# Patient Record
Sex: FEMALE | Race: BLACK OR AFRICAN AMERICAN | NOT HISPANIC OR LATINO | Employment: UNEMPLOYED | URBAN - METROPOLITAN AREA
[De-identification: names, ages, dates, MRNs, and addresses within clinical notes are randomized per-mention and may not be internally consistent; named-entity substitution may affect disease eponyms.]

---

## 2022-10-01 ENCOUNTER — APPOINTMENT (OUTPATIENT)
Dept: CT IMAGING | Facility: HOSPITAL | Age: 67
End: 2022-10-01

## 2022-10-01 ENCOUNTER — APPOINTMENT (OUTPATIENT)
Dept: GENERAL RADIOLOGY | Facility: HOSPITAL | Age: 67
End: 2022-10-01

## 2022-10-01 ENCOUNTER — HOSPITAL ENCOUNTER (OUTPATIENT)
Facility: HOSPITAL | Age: 67
Setting detail: OBSERVATION
Discharge: HOME | End: 2022-10-02
Attending: EMERGENCY MEDICINE | Admitting: INTERNAL MEDICINE

## 2022-10-01 DIAGNOSIS — R10.10 UPPER ABDOMINAL PAIN: ICD-10-CM

## 2022-10-01 DIAGNOSIS — I16.0 HYPERTENSIVE URGENCY: Primary | ICD-10-CM

## 2022-10-01 DIAGNOSIS — R07.89 ATYPICAL CHEST PAIN: ICD-10-CM

## 2022-10-01 DIAGNOSIS — N83.201 RIGHT OVARIAN CYST: ICD-10-CM

## 2022-10-01 LAB
ALBUMIN SERPL-MCNC: 4.2 G/DL (ref 3.5–5.2)
ALBUMIN/GLOB SERPL: 1.2 G/DL
ALP SERPL-CCNC: 82 U/L (ref 39–117)
ALT SERPL W P-5'-P-CCNC: 17 U/L (ref 1–33)
ANION GAP SERPL CALCULATED.3IONS-SCNC: 10.4 MMOL/L (ref 5–15)
AST SERPL-CCNC: 25 U/L (ref 1–32)
BASOPHILS # BLD AUTO: 0.01 10*3/MM3 (ref 0–0.2)
BASOPHILS NFR BLD AUTO: 0.2 % (ref 0–1.5)
BILIRUB SERPL-MCNC: 0.3 MG/DL (ref 0–1.2)
BILIRUB UR QL STRIP: NEGATIVE
BUN SERPL-MCNC: 14 MG/DL (ref 8–23)
BUN/CREAT SERPL: 16.3 (ref 7–25)
CALCIUM SPEC-SCNC: 8.7 MG/DL (ref 8.6–10.5)
CHLORIDE SERPL-SCNC: 100 MMOL/L (ref 98–107)
CLARITY UR: CLEAR
CO2 SERPL-SCNC: 28.6 MMOL/L (ref 22–29)
COLOR UR: YELLOW
CREAT SERPL-MCNC: 0.86 MG/DL (ref 0.57–1)
DEPRECATED RDW RBC AUTO: 41.2 FL (ref 37–54)
EGFRCR SERPLBLD CKD-EPI 2021: 73.2 ML/MIN/1.73
EOSINOPHIL # BLD AUTO: 0.02 10*3/MM3 (ref 0–0.4)
EOSINOPHIL NFR BLD AUTO: 0.4 % (ref 0.3–6.2)
ERYTHROCYTE [DISTWIDTH] IN BLOOD BY AUTOMATED COUNT: 12.4 % (ref 12.3–15.4)
GLOBULIN UR ELPH-MCNC: 3.4 GM/DL
GLUCOSE SERPL-MCNC: 90 MG/DL (ref 65–99)
GLUCOSE UR STRIP-MCNC: NEGATIVE MG/DL
HCT VFR BLD AUTO: 37.3 % (ref 34–46.6)
HGB BLD-MCNC: 12 G/DL (ref 12–15.9)
HGB UR QL STRIP.AUTO: NEGATIVE
HOLD SPECIMEN: NORMAL
HOLD SPECIMEN: NORMAL
IMM GRANULOCYTES # BLD AUTO: 0.02 10*3/MM3 (ref 0–0.05)
IMM GRANULOCYTES NFR BLD AUTO: 0.4 % (ref 0–0.5)
KETONES UR QL STRIP: NEGATIVE
LEUKOCYTE ESTERASE UR QL STRIP.AUTO: NEGATIVE
LIPASE SERPL-CCNC: 16 U/L (ref 13–60)
LYMPHOCYTES # BLD AUTO: 2.21 10*3/MM3 (ref 0.7–3.1)
LYMPHOCYTES NFR BLD AUTO: 43.9 % (ref 19.6–45.3)
MCH RBC QN AUTO: 28.9 PG (ref 26.6–33)
MCHC RBC AUTO-ENTMCNC: 32.2 G/DL (ref 31.5–35.7)
MCV RBC AUTO: 89.9 FL (ref 79–97)
MONOCYTES # BLD AUTO: 0.4 10*3/MM3 (ref 0.1–0.9)
MONOCYTES NFR BLD AUTO: 8 % (ref 5–12)
NEUTROPHILS NFR BLD AUTO: 2.37 10*3/MM3 (ref 1.7–7)
NEUTROPHILS NFR BLD AUTO: 47.1 % (ref 42.7–76)
NITRITE UR QL STRIP: NEGATIVE
NRBC BLD AUTO-RTO: 1 /100 WBC (ref 0–0.2)
PH UR STRIP.AUTO: 7 [PH] (ref 5–8)
PLATELET # BLD AUTO: 272 10*3/MM3 (ref 140–450)
PMV BLD AUTO: 8.6 FL (ref 6–12)
POTASSIUM SERPL-SCNC: 3.7 MMOL/L (ref 3.5–5.2)
PROT SERPL-MCNC: 7.6 G/DL (ref 6–8.5)
PROT UR QL STRIP: NEGATIVE
QT INTERVAL: 368 MS
RBC # BLD AUTO: 4.15 10*6/MM3 (ref 3.77–5.28)
SODIUM SERPL-SCNC: 139 MMOL/L (ref 136–145)
SP GR UR STRIP: 1.02 (ref 1–1.03)
TROPONIN T SERPL-MCNC: <0.01 NG/ML (ref 0–0.03)
TROPONIN T SERPL-MCNC: <0.01 NG/ML (ref 0–0.03)
UROBILINOGEN UR QL STRIP: NORMAL
WBC NRBC COR # BLD: 5.03 10*3/MM3 (ref 3.4–10.8)
WHOLE BLOOD HOLD COAG: NORMAL
WHOLE BLOOD HOLD SPECIMEN: NORMAL

## 2022-10-01 PROCEDURE — G0378 HOSPITAL OBSERVATION PER HR: HCPCS

## 2022-10-01 PROCEDURE — 93005 ELECTROCARDIOGRAM TRACING: CPT | Performed by: EMERGENCY MEDICINE

## 2022-10-01 PROCEDURE — 71275 CT ANGIOGRAPHY CHEST: CPT

## 2022-10-01 PROCEDURE — 96375 TX/PRO/DX INJ NEW DRUG ADDON: CPT

## 2022-10-01 PROCEDURE — 25010000002 MORPHINE PER 10 MG: Performed by: EMERGENCY MEDICINE

## 2022-10-01 PROCEDURE — 96374 THER/PROPH/DIAG INJ IV PUSH: CPT

## 2022-10-01 PROCEDURE — 74177 CT ABD & PELVIS W/CONTRAST: CPT

## 2022-10-01 PROCEDURE — 25010000002 ONDANSETRON PER 1 MG: Performed by: EMERGENCY MEDICINE

## 2022-10-01 PROCEDURE — 85025 COMPLETE CBC W/AUTO DIFF WBC: CPT | Performed by: EMERGENCY MEDICINE

## 2022-10-01 PROCEDURE — 80053 COMPREHEN METABOLIC PANEL: CPT | Performed by: EMERGENCY MEDICINE

## 2022-10-01 PROCEDURE — 0 IOPAMIDOL PER 1 ML: Performed by: EMERGENCY MEDICINE

## 2022-10-01 PROCEDURE — 93010 ELECTROCARDIOGRAM REPORT: CPT | Performed by: STUDENT IN AN ORGANIZED HEALTH CARE EDUCATION/TRAINING PROGRAM

## 2022-10-01 PROCEDURE — 96376 TX/PRO/DX INJ SAME DRUG ADON: CPT

## 2022-10-01 PROCEDURE — 99285 EMERGENCY DEPT VISIT HI MDM: CPT

## 2022-10-01 PROCEDURE — 36415 COLL VENOUS BLD VENIPUNCTURE: CPT

## 2022-10-01 PROCEDURE — 83690 ASSAY OF LIPASE: CPT | Performed by: EMERGENCY MEDICINE

## 2022-10-01 PROCEDURE — 84484 ASSAY OF TROPONIN QUANT: CPT | Performed by: EMERGENCY MEDICINE

## 2022-10-01 PROCEDURE — 71045 X-RAY EXAM CHEST 1 VIEW: CPT

## 2022-10-01 PROCEDURE — 81003 URINALYSIS AUTO W/O SCOPE: CPT | Performed by: EMERGENCY MEDICINE

## 2022-10-01 RX ORDER — ONDANSETRON 2 MG/ML
4 INJECTION INTRAMUSCULAR; INTRAVENOUS ONCE
Status: COMPLETED | OUTPATIENT
Start: 2022-10-01 | End: 2022-10-01

## 2022-10-01 RX ORDER — ONDANSETRON 2 MG/ML
4 INJECTION INTRAMUSCULAR; INTRAVENOUS EVERY 6 HOURS PRN
Status: DISCONTINUED | OUTPATIENT
Start: 2022-10-01 | End: 2022-10-02 | Stop reason: HOSPADM

## 2022-10-01 RX ORDER — ONDANSETRON 4 MG/1
4 TABLET, FILM COATED ORAL EVERY 6 HOURS PRN
Status: DISCONTINUED | OUTPATIENT
Start: 2022-10-01 | End: 2022-10-02 | Stop reason: HOSPADM

## 2022-10-01 RX ORDER — MORPHINE SULFATE 2 MG/ML
4 INJECTION, SOLUTION INTRAMUSCULAR; INTRAVENOUS ONCE
Status: COMPLETED | OUTPATIENT
Start: 2022-10-01 | End: 2022-10-01

## 2022-10-01 RX ORDER — LABETALOL HYDROCHLORIDE 5 MG/ML
20 INJECTION, SOLUTION INTRAVENOUS ONCE
Status: COMPLETED | OUTPATIENT
Start: 2022-10-01 | End: 2022-10-01

## 2022-10-01 RX ORDER — LABETALOL HYDROCHLORIDE 5 MG/ML
10 INJECTION, SOLUTION INTRAVENOUS ONCE
Status: COMPLETED | OUTPATIENT
Start: 2022-10-01 | End: 2022-10-01

## 2022-10-01 RX ORDER — NITROGLYCERIN 0.4 MG/1
0.4 TABLET SUBLINGUAL
Status: DISCONTINUED | OUTPATIENT
Start: 2022-10-01 | End: 2022-10-02 | Stop reason: HOSPADM

## 2022-10-01 RX ORDER — VALSARTAN 80 MG/1
80 TABLET ORAL
Status: DISCONTINUED | OUTPATIENT
Start: 2022-10-01 | End: 2022-10-02 | Stop reason: HOSPADM

## 2022-10-01 RX ORDER — ACETAMINOPHEN 325 MG/1
650 TABLET ORAL EVERY 4 HOURS PRN
Status: DISCONTINUED | OUTPATIENT
Start: 2022-10-01 | End: 2022-10-02 | Stop reason: HOSPADM

## 2022-10-01 RX ORDER — SODIUM CHLORIDE 0.9 % (FLUSH) 0.9 %
10 SYRINGE (ML) INJECTION AS NEEDED
Status: DISCONTINUED | OUTPATIENT
Start: 2022-10-01 | End: 2022-10-02 | Stop reason: HOSPADM

## 2022-10-01 RX ORDER — SODIUM CHLORIDE 9 MG/ML
75 INJECTION, SOLUTION INTRAVENOUS CONTINUOUS
Status: DISCONTINUED | OUTPATIENT
Start: 2022-10-01 | End: 2022-10-02 | Stop reason: HOSPADM

## 2022-10-01 RX ORDER — UREA 10 %
3 LOTION (ML) TOPICAL NIGHTLY PRN
Status: DISCONTINUED | OUTPATIENT
Start: 2022-10-01 | End: 2022-10-02 | Stop reason: HOSPADM

## 2022-10-01 RX ADMIN — ACETAMINOPHEN 650 MG: 325 TABLET, FILM COATED ORAL at 22:20

## 2022-10-01 RX ADMIN — LABETALOL HYDROCHLORIDE 10 MG: 5 INJECTION, SOLUTION INTRAVENOUS at 17:53

## 2022-10-01 RX ADMIN — ONDANSETRON 4 MG: 2 INJECTION INTRAMUSCULAR; INTRAVENOUS at 11:37

## 2022-10-01 RX ADMIN — SODIUM CHLORIDE 75 ML/HR: 9 INJECTION, SOLUTION INTRAVENOUS at 22:22

## 2022-10-01 RX ADMIN — IOPAMIDOL 95 ML: 755 INJECTION, SOLUTION INTRAVENOUS at 14:26

## 2022-10-01 RX ADMIN — VALSARTAN 80 MG: 80 TABLET, FILM COATED ORAL at 22:20

## 2022-10-01 RX ADMIN — LABETALOL HYDROCHLORIDE 20 MG: 5 INJECTION, SOLUTION INTRAVENOUS at 11:37

## 2022-10-01 RX ADMIN — MORPHINE SULFATE 4 MG: 2 INJECTION, SOLUTION INTRAMUSCULAR; INTRAVENOUS at 11:38

## 2022-10-01 NOTE — ED PROVIDER NOTES
I supervised care provided by the midlevel provider.   We have discussed this patient's history, physical exam, and treatment plan.  I have reviewed the note and personally saw and examined the patient and agree with the plan of care.   I have seen and examined this patient.  Patient's son who helps translate his bedside as well as  phone used.  Susan peacock is present as well and has obtained history as well as myself.  Patient started early this morning about 2 or 2:30 in the morning with some midepigastric abdominal pain.There is been some nausea and the pain has been constant since that time.  Denied any vomiting or diarrhea.  The pain does radiate up the center of the lower portion of her chest.  She has had previous surgery to her abdomen but cannot tell me specifically what was done.  She had bowel surgery.  She recently moved from [place] about 5 months ago and has been not taking any of her medicines including her high blood pressure medicine.    GENERAL: Elderly female that appears a little uncomfortable no obvious distress.  Patient has significantly elevated blood pressure.  HENT: nares patent  Head/neck/ face are symmetric without gross deformity or swelling  EYES: no scleral icterus  CV: regular rhythm, regular rate with intact distal pulses  RESPIRATORY: normal effort and no respiratory distress  ABDOMEN: soft and \reproducible midepigastric pain and right upper quadrant pain with palpation.  There is some mild voluntary guarding.  There is no rebound.  She has positive bowel sounds.  MUSCULOSKELETAL: no deformity.  Intact distal pulses to upper and lower extremities that are equal strong and symmetric.  NEURO: alert and appropriate, moves all extremities, follows commands  SKIN: warm, dry    Vital signs and nursing notes reviewed.    Plan we discussed with the  as well as the patient and the family in the room of the test that we will order.  I believe this is mainly coming from her  abdomen and unlikely cardiac in etiology.  We will check CT scans.  All questions answered at this time.        ED Course as of 10/02/22 1740   Sat Oct 01, 2022   1125 EKG reading  EKG was done at 11:00 I read the EKG at 11:04 AM  Its rate of 84 it is sinus rhythm there is some movement artifact  Narrow complex with normal axis  I do not appreciate any acute injury pattern  QT looks normal  No old EKG to compare with. [MM]   1206 WBC: 5.03 [KA]   1206 Hemoglobin: 12.0 [KA]   1245 WBC: 5.03 [KA]   1245 Hemoglobin: 12.0 [KA]   1245 Lipase: 16 [KA]   1425 Troponin T: <0.010 [KA]   1425 Glucose: 90 [KA]   1425 Creatinine: 0.86 [KA]   1425 Lipase: 16 [KA]   1541 I reviewed the CT a of the chest as well as a CT of the abdomen pelvis.  In reviewing the report there was no evidence of aortic dissection or aneurysm.  There is right adnexal cystic lesion that is about 5.3 cm.  Previous cholecystectomy.  No  acute emergent condition appreciated.  Please see complete dictated report from the radiologist. [MM]   1543 I have reevaluated the patient.  Blood pressure transiently was improved but is now back up.  We will get a need to give her some more labetalol.  Overall patient is feeling better.  She states that her pain is much better.  Not having much pain at all at this time.  I talked with the patient as well as the family in the room and her son translated.  Informed them of the results of the CT scans and lab work.  All questions answered. [MM]   1549 Patient's blood pressure was 170/88 at 3:46 PM.  Heart rate was in the 70s. [MM]   1626 I discussed the patient with Dr. Benitez, hospitalist.  We discussed the patient's history presentation labs and imaging and she agrees to admit for further evaluation and treatment. [KA]      ED Course User Index  [KA] Abby Babb PA  [MM] Brooks Eller MD       We are currently under a pandemic from the COVID19 infection.  The patient presented to the emergency department by  ambulance or personal vehicle. I followed the current protocols required by Infection Control at [facility] in my evaluation and treatment of the patient. The patient was wearing a face mask during my evaluation and throughout my encounter. During my whole encounter with this patient I used appropriate personal protective equipment.  This equipment consisted of eye protection, facemask, gown, and gloves.  I applied this equipment before entering the room.           Brooks Eller MD  10/02/22 6941

## 2022-10-01 NOTE — ED PROVIDER NOTES
EMERGENCY DEPARTMENT ENCOUNTER    Room Number:  36/36  Date seen:  10/1/2022  Time seen: 10:55 EDT  PCP: No primary care provider on file.  Historian: patient      HPI:  Chief Complaint: Abdominal pain, shortness of breath    A complete HPI/ROS/PMH/PSH/SH/FH are unobtainable due to: None    Context: Suleman Dumont is a 67 y.o. female who presents to the ED for evaluation of cute onset upper abdominal pain dyspnea and nausea that began at 2 AM, woke her from sleep, is constant since.  Nothing really makes it worse, states maybe is a little better after being administered aspirin by EMS.  She reports feeling intermittently short of breath, symptoms are not exertional or pleuritic.  She denies any vomiting diarrhea urinary symptoms.  She drinks about half a beer once a week, no other alcohol use.  Has had a previous surgery on her abdomen, and unspecified bowel surgery that patient and family do not seem to know the details of.  No other abdominal surgeries.  No cardiac disease history, no prior cardiac work-up that they know of.  Patient and family moved here from [place] about 5 months ago, she does not have a PCP.  Has a history of significant hypertension, was previously treated but has been unmedicated since moving.  No known history of high cholesterol or diabetes.    I spent 25 minutes with the patient and family and Brady  ID #[id number] obtaining the past medical history, HPI, physical exam, discussing with them the ER work-up and interventions and answering all questions.    PAST MEDICAL HISTORY  Active Ambulatory Problems     Diagnosis Date Noted   • No Active Ambulatory Problems     Resolved Ambulatory Problems     Diagnosis Date Noted   • No Resolved Ambulatory Problems     No Additional Past Medical History         PAST SURGICAL HISTORY  No past surgical history on file.      FAMILY HISTORY  No family history on file.      SOCIAL HISTORY         ALLERGIES  Shellfish-derived  products        REVIEW OF SYSTEMS  Review of Systems     All systems reviewed and negative except for those discussed in HPI.       PHYSICAL EXAM  ED Triage Vitals [10/01/22 1046]   Temp Heart Rate Resp BP SpO2   97.4 °F (36.3 °C) 82 18 (!) 191/100 98 %      Temp src Heart Rate Source Patient Position BP Location FiO2 (%)   Tympanic Monitor -- -- --         GENERAL: not distressed  HENT: atraumatic  EYES: no scleral icterus  CV: regular rhythm, regular rate, DP and PT pulses 2+, no lower extremity edema  RESPIRATORY: normal effort CTA B, oxygen is 100% on room air  ABDOMEN: soft, tender mostly in the epigastrium, mildly in the right upper quadrant and left upper quadrants.  Nondistended normal bowel sounds no guarding or rigidity  MUSCULOSKELETAL: no deformity  NEURO: alert, moves all extremities, follows commands  SKIN: warm, dry    Vital signs and nursing notes reviewed.          LAB RESULTS  Labs Reviewed   CBC WITH AUTO DIFFERENTIAL - Abnormal; Notable for the following components:       Result Value    nRBC 1.0 (*)     All other components within normal limits   BASIC METABOLIC PANEL - Abnormal; Notable for the following components:    Glucose 113 (*)     All other components within normal limits    Narrative:     GFR Normal >60  Chronic Kidney Disease <60  Kidney Failure <15     CBC (NO DIFF) - Abnormal; Notable for the following components:    Hemoglobin 11.3 (*)     Hematocrit 33.5 (*)     RDW 11.9 (*)     RDW-SD 36.9 (*)     All other components within normal limits   TROPONIN (IN-HOUSE) - Normal    Narrative:     Troponin T Reference Range:  <= 0.03 ng/mL-   Negative for AMI  >0.03 ng/mL-     Abnormal for myocardial necrosis.  Clinicians would have to utilize clinical acumen, EKG, Troponin and serial changes to determine if it is an Acute Myocardial Infarction or myocardial injury due to an underlying chronic condition.       Results may be falsely decreased if patient taking Biotin.     TROPONIN  (IN-HOUSE) - Normal    Narrative:     Troponin T Reference Range:  <= 0.03 ng/mL-   Negative for AMI  >0.03 ng/mL-     Abnormal for myocardial necrosis.  Clinicians would have to utilize clinical acumen, EKG, Troponin and serial changes to determine if it is an Acute Myocardial Infarction or myocardial injury due to an underlying chronic condition.       Results may be falsely decreased if patient taking Biotin.     LIPASE - Normal   URINALYSIS W/ MICROSCOPIC IF INDICATED (NO CULTURE) - Normal    Narrative:     Urine microscopic not indicated.   RAINBOW DRAW    Narrative:     The following orders were created for panel order [place] Draw.  Procedure                               Abnormality         Status                     ---------                               -----------         ------                     Green Top (Gel)[[id number]]                                  Final result               Lavender Top[[id number]]                                     Final result               Gold Top - SST[[id number]]                                   Final result               Light Blue Top[[id number]]                                   Final result                 Please view results for these tests on the individual orders.   COMPREHENSIVE METABOLIC PANEL    Narrative:     GFR Normal >60  Chronic Kidney Disease <60  Kidney Failure <15     GREEN TOP   LAVENDER TOP   GOLD TOP - SST   LIGHT BLUE TOP   CBC AND DIFFERENTIAL    Narrative:     The following orders were created for panel order CBC & Differential.  Procedure                               Abnormality         Status                     ---------                               -----------         ------                     CBC Auto Differential[[id number]]        Abnormal            Final result                 Please view results for these tests on the individual orders.         Ordered the above labs and independently reviewed the results.        RADIOLOGY  CT Abdomen  Pelvis With Contrast, CT Angiogram Chest    Result Date: 10/1/2022  Narrative: CT ANGIOGRAM OF THE CHEST, CT ABDOMEN AND PELVIS WITH IV CONTRAST  HISTORY: Upper abdominal pain and nausea, concern for aortic dissection  TECHNIQUE: Radiation dose reduction techniques were utilized, including automated exposure control and exposure modulation based on body size. CT angiogram of the chest was performed following the administration of IV contrast. Multiple coronal, sagittal, and 3-D reconstruction images were obtained. Following this, CT of the abdomen and pelvis was performed with IV contrast. Coronal and sagittal reformatted images were obtained.  COMPARISON: None available  FINDINGS: CHEST: There is no evidence of aortic dissection or thoracic aortic aneurysm. There is no appreciable lymphadenopathy. There is no pleural effusion. There is dependent atelectasis within the lungs bilaterally. No evidence of dense airspace consolidation. No acute bony abnormality.  ABDOMEN: The liver is unremarkable. Previous cholecystectomy. Small cyst or hemangioma in the spleen. The kidneys, adrenal glands, and pancreas are unremarkable. No evidence of aortic aneurysm.  PELVIS: There is no free fluid in the pelvis. There is a cystic lesion with septations in the right adnexa measuring about 5.3 x 3.9 cm. Colon is unremarkable. The appendix is normal. No acute bony abnormality.      Impression: 1. No evidence of aortic dissection or aneurysm 2. There is a cystic lesion with septations in the right adnexa measures about 5.3 cm. Further evaluation recommended with pelvic ultrasound as well as referral to OB/GYN 3. Previous cholecystectomy.   Radiation dose reduction techniques were utilized, including automated exposure control and exposure modulation based on body size.  This report was finalized on 10/1/2022 2:52 PM by Dr. Ankur Gates M.D.      XR Chest 1 View    Result Date: 10/1/2022  Narrative: XR CHEST 1 VW-  HISTORY:  Nausea,  vomiting, pain.  COMPARISON:  None  FINDINGS:  A single portable view the chest was obtained. The cardiac silhouette is mildly enlarged. There is calcific aortic atherosclerosis. There are mildly prominent interstitial opacities throughout both lungs, most pronounced at the lung bases, which are nonspecific and could reflect edema, background scarring, and/or multifocal pneumonia in the appropriate clinical setting. Otherwise, there is no focal consolidation. Pleural spaces are clear. There is multilevel degenerative disc disease.  This report was finalized on 10/1/2022 11:50 AM by Dr. Reema Diamond M.D.        I ordered the above noted radiological studies. Reviewed by me and discussed with radiologist.  See dictation for official radiology interpretation.    PROCEDURES  Procedures        MEDICATIONS GIVEN IN ER  Medications   sodium chloride 0.9 % flush 10 mL (has no administration in time range)   labetalol (NORMODYNE,TRANDATE) injection 20 mg (has no administration in time range)   ondansetron (ZOFRAN) injection 4 mg (has no administration in time range)   morphine injection 4 mg (has no administration in time range)             PROGRESS AND CONSULTS    DDx includes but is not limited to acute coronary syndrome, pulmonary embolism, thoracic aortic dissection, pneumonia, pneumothorax, musculoskeletal pain, GERD or esophageal spasm, anxiety, myocarditis/pericarditis, esophageal rupture, pancreatitis.     History: 0  EC  Age: 1  Risk factors: 1    HEART score: 3        ED Course as of 10/02/22 1341   Sat Oct 01, 2022   1125 EKG reading  EKG was done at 11:00 I read the EKG at 11:04 AM  Its rate of 84 it is sinus rhythm there is some movement artifact  Narrow complex with normal axis  I do not appreciate any acute injury pattern  QT looks normal  No old EKG to compare with. [MM]   1206 WBC: 5.03 [KA]   1206 Hemoglobin: 12.0 [KA]   1245 WBC: 5.03 [KA]   1245 Hemoglobin: 12.0 [KA]   1245 Lipase: 16 [KA]   1425  Troponin T: <0.010 [KA]   1425 Glucose: 90 [KA]   1425 Creatinine: 0.86 [KA]   1425 Lipase: 16 [KA]   1541 I reviewed the CT a of the chest as well as a CT of the abdomen pelvis.  In reviewing the report there was no evidence of aortic dissection or aneurysm.  There is right adnexal cystic lesion that is about 5.3 cm.  Previous cholecystectomy.  No  acute emergent condition appreciated.  Please see complete dictated report from the radiologist. [MM]   1543 I have reevaluated the patient.  Blood pressure transiently was improved but is now back up.  We will get a need to give her some more labetalol.  Overall patient is feeling better.  She states that her pain is much better.  Not having much pain at all at this time.  I talked with the patient as well as the family in the room and her son translated.  Informed them of the results of the CT scans and lab work.  All questions answered. [MM]   1549 Patient's blood pressure was 170/88 at 3:46 PM.  Heart rate was in the 70s. [MM]   1626 I discussed the patient with Dr. Benitez, hospitalist.  We discussed the patient's history presentation labs and imaging and she agrees to admit for further evaluation and treatment. [KA]      ED Course User Index  [KA] Abby Babb PA  [MM] Brooks Eller MD             Patient was placed in face mask in first look. Patient was wearing facemask each time I entered the room and throughout our encounter. I wore protective equipment throughout this patient encounter including a face mask, eye shield and gloves. Hand hygiene was performed before donning protective equipment and after removal when leaving the room.        DIAGNOSIS  Final diagnoses:   Hypertensive urgency   Atypical chest pain   Upper abdominal pain   Right ovarian cyst           Latest Documented Vital Signs:  As of 11:33 EDT  BP- (!) 191/100 HR- 82 Temp- 97.4 °F (36.3 °C) (Tympanic) O2 sat- 98%       Abby Babb PA  10/02/22 1343

## 2022-10-01 NOTE — ED NOTES
"Pt to ED from home via EMS c/o CP, SOA and HTN, when pt is is asked to point to area of pain she directs to bilateral flank pain and mid epigastric region (towards low left anterior chest), pt denies asociated N/V pt reports hx of HTN and taking medication for HTN but is not taking medication right now, pt reports receiving Aspirin from EMS and \"feels better, but still some pain\" pt unsure of Nitro administration, pt a/o x 4 at this time Spo2 96% RA at this time.  All communication done through [facility]  RENEE Ricketts    PPE per protocol utilized throughout entire patient encounter.     "

## 2022-10-01 NOTE — ED NOTES
Nursing report ED to floor  Larry Dumont  67 y.o.  female    HPI :   Chief Complaint   Patient presents with   • Chest Pain   • Flank Pain   • Abdominal Pain       Admitting doctor:   Aga Benitez MD    Admitting diagnosis:   The primary encounter diagnosis was Hypertensive urgency. Diagnoses of Atypical chest pain and Upper abdominal pain were also pertinent to this visit.    Code status:   Current Code Status     Date Active Code Status Order ID Comments User Context       Not on file    Advance Care Planning Activity          Allergies:   Shellfish-derived products    Isolation:   No active isolations    Intake and Output  No intake or output data in the 24 hours ending 10/01/22 1648    Weight:   There were no vitals filed for this visit.    Most recent vitals:   Vitals:    10/01/22 1356 10/01/22 1546 10/01/22 1557 10/01/22 1618   BP: 175/97 170/88 149/82 125/80   BP Location:       Pulse: 72 73 69 70   Resp:       Temp:       TempSrc:       SpO2: 94% 96% 94% 96%       Active LDAs/IV Access:   Lines, Drains & Airways     Active LDAs     Name Placement date Placement time Site Days    Peripheral IV 10/01/22 1051 Right Antecubital 10/01/22  1051  Antecubital  less than 1                Labs (abnormal labs have a star):   Labs Reviewed   CBC WITH AUTO DIFFERENTIAL - Abnormal; Notable for the following components:       Result Value    nRBC 1.0 (*)     All other components within normal limits   TROPONIN (IN-HOUSE) - Normal    Narrative:     Troponin T Reference Range:  <= 0.03 ng/mL-   Negative for AMI  >0.03 ng/mL-     Abnormal for myocardial necrosis.  Clinicians would have to utilize clinical acumen, EKG, Troponin and serial changes to determine if it is an Acute Myocardial Infarction or myocardial injury due to an underlying chronic condition.       Results may be falsely decreased if patient taking Biotin.     TROPONIN (IN-HOUSE) - Normal    Narrative:     Troponin T Reference Range:  <= 0.03  ng/mL-   Negative for AMI  >0.03 ng/mL-     Abnormal for myocardial necrosis.  Clinicians would have to utilize clinical acumen, EKG, Troponin and serial changes to determine if it is an Acute Myocardial Infarction or myocardial injury due to an underlying chronic condition.       Results may be falsely decreased if patient taking Biotin.     LIPASE - Normal   URINALYSIS W/ MICROSCOPIC IF INDICATED (NO CULTURE) - Normal    Narrative:     Urine microscopic not indicated.   RAINBOW DRAW    Narrative:     The following orders were created for panel order [place] Draw.  Procedure                               Abnormality         Status                     ---------                               -----------         ------                     Green Top (Gel)[[id number]]                                  Final result               Lavender Top[[id number]]                                     Final result               Gold Top - SST[[id number]]                                   Final result               Light Blue Top[[id number]]                                   Final result                 Please view results for these tests on the individual orders.   COMPREHENSIVE METABOLIC PANEL    Narrative:     GFR Normal >60  Chronic Kidney Disease <60  Kidney Failure <15     GREEN TOP   LAVENDER TOP   GOLD TOP - SST   LIGHT BLUE TOP   CBC AND DIFFERENTIAL    Narrative:     The following orders were created for panel order CBC & Differential.  Procedure                               Abnormality         Status                     ---------                               -----------         ------                     CBC Auto Differential[[id number]]        Abnormal            Final result                 Please view results for these tests on the individual orders.       EKG:   ECG 12 Lead   Final Result   HEART RATE= 84  bpm   RR Interval= 714  ms   MA Interval= 145  ms   P Horizontal Axis= -5  deg   P Front Axis= 66  deg   QRSD Interval=  82  ms   QT Interval= 368  ms   QRS Axis= 40  deg   T Wave Axis= 57  deg   - BORDERLINE ECG -   Sinus rhythm   Probable left atrial enlargement   Abnormal R-wave progression, early transition   Baseline wander in lead(s) II, III   Electronically Signed By: Newton Mccollum ([facility]) 01-Oct-2022 13:40:58   Date and Time of Study: 2022-10-01 11:00:56          Meds given in ED:   Medications   sodium chloride 0.9 % flush 10 mL (has no administration in time range)   labetalol (NORMODYNE,TRANDATE) injection 10 mg (has no administration in time range)   labetalol (NORMODYNE,TRANDATE) injection 20 mg (20 mg Intravenous Given 10/1/22 1137)   ondansetron (ZOFRAN) injection 4 mg (4 mg Intravenous Given 10/1/22 1137)   morphine injection 4 mg (4 mg Intravenous Given 10/1/22 1138)   iopamidol (ISOVUE-370) 76 % injection 100 mL (95 mL Intravenous Given 10/1/22 1426)       Imaging results:  CT Abdomen Pelvis With Contrast    Result Date: 10/1/2022  1. No evidence of aortic dissection or aneurysm 2. There is a cystic lesion with septations in the right adnexa measures about 5.3 cm. Further evaluation recommended with pelvic ultrasound as well as referral to OB/GYN 3. Previous cholecystectomy.   Radiation dose reduction techniques were utilized, including automated exposure control and exposure modulation based on body size.  This report was finalized on 10/1/2022 2:52 PM by Dr. Ankur Gates M.D.      CT Angiogram Chest    Result Date: 10/1/2022  1. No evidence of aortic dissection or aneurysm 2. There is a cystic lesion with septations in the right adnexa measures about 5.3 cm. Further evaluation recommended with pelvic ultrasound as well as referral to OB/GYN 3. Previous cholecystectomy.   Radiation dose reduction techniques were utilized, including automated exposure control and exposure modulation based on body size.  This report was finalized on 10/1/2022 2:52 PM by Dr. Ankur Gates M.D.        Ambulatory status:   - stand by  assist    Social issues:   Social History     Socioeconomic History   • Marital status:        NIH Stroke Scale:         Aparna Maciel RN  10/01/22 16:48 EDT

## 2022-10-01 NOTE — ED TRIAGE NOTES
Cp, epigastric pain and bilat flank pain started last night.  She has been hypertensive on ems.  Given 324 mg aspirin enroute    Primary language: ant[place]    Patient was placed in face mask during first look triage.  Patient was wearing a face mask throughout encounter.  I wore personal protective equipment throughout the encounter.  Hand hygiene was performed before and after patient encounter.

## 2022-10-02 VITALS
RESPIRATION RATE: 18 BRPM | SYSTOLIC BLOOD PRESSURE: 178 MMHG | TEMPERATURE: 98.1 F | WEIGHT: 135.2 LBS | BODY MASS INDEX: 26.55 KG/M2 | DIASTOLIC BLOOD PRESSURE: 80 MMHG | HEIGHT: 60 IN | OXYGEN SATURATION: 98 % | HEART RATE: 99 BPM

## 2022-10-02 PROBLEM — R11.0 NAUSEA: Status: ACTIVE | Noted: 2022-10-02

## 2022-10-02 PROBLEM — K59.01 SLOW TRANSIT CONSTIPATION: Status: ACTIVE | Noted: 2022-10-02

## 2022-10-02 PROBLEM — R93.5 ABNORMAL CT OF THE ABDOMEN: Status: ACTIVE | Noted: 2022-10-02

## 2022-10-02 LAB
ANION GAP SERPL CALCULATED.3IONS-SCNC: 11.4 MMOL/L (ref 5–15)
BUN SERPL-MCNC: 16 MG/DL (ref 8–23)
BUN/CREAT SERPL: 19 (ref 7–25)
CALCIUM SPEC-SCNC: 8.8 MG/DL (ref 8.6–10.5)
CHLORIDE SERPL-SCNC: 101 MMOL/L (ref 98–107)
CO2 SERPL-SCNC: 24.6 MMOL/L (ref 22–29)
CREAT SERPL-MCNC: 0.84 MG/DL (ref 0.57–1)
DEPRECATED RDW RBC AUTO: 36.9 FL (ref 37–54)
EGFRCR SERPLBLD CKD-EPI 2021: 75.3 ML/MIN/1.73
ERYTHROCYTE [DISTWIDTH] IN BLOOD BY AUTOMATED COUNT: 11.9 % (ref 12.3–15.4)
GLUCOSE SERPL-MCNC: 113 MG/DL (ref 65–99)
HCT VFR BLD AUTO: 33.5 % (ref 34–46.6)
HGB BLD-MCNC: 11.3 G/DL (ref 12–15.9)
MCH RBC QN AUTO: 28.6 PG (ref 26.6–33)
MCHC RBC AUTO-ENTMCNC: 33.7 G/DL (ref 31.5–35.7)
MCV RBC AUTO: 84.8 FL (ref 79–97)
PLATELET # BLD AUTO: 250 10*3/MM3 (ref 140–450)
PMV BLD AUTO: 8.5 FL (ref 6–12)
POTASSIUM SERPL-SCNC: 3.9 MMOL/L (ref 3.5–5.2)
RBC # BLD AUTO: 3.95 10*6/MM3 (ref 3.77–5.28)
SODIUM SERPL-SCNC: 137 MMOL/L (ref 136–145)
WBC NRBC COR # BLD: 5.4 10*3/MM3 (ref 3.4–10.8)

## 2022-10-02 PROCEDURE — G0378 HOSPITAL OBSERVATION PER HR: HCPCS

## 2022-10-02 PROCEDURE — 80048 BASIC METABOLIC PNL TOTAL CA: CPT | Performed by: INTERNAL MEDICINE

## 2022-10-02 PROCEDURE — 85027 COMPLETE CBC AUTOMATED: CPT | Performed by: INTERNAL MEDICINE

## 2022-10-02 RX ORDER — POLYETHYLENE GLYCOL 3350 17 G/17G
17 POWDER, FOR SOLUTION ORAL DAILY
Status: DISCONTINUED | OUTPATIENT
Start: 2022-10-02 | End: 2022-10-02 | Stop reason: HOSPADM

## 2022-10-02 RX ORDER — AMLODIPINE BESYLATE 5 MG/1
5 TABLET ORAL
Qty: 30 TABLET | Refills: 0 | Status: SHIPPED | OUTPATIENT
Start: 2022-10-02

## 2022-10-02 RX ORDER — AMOXICILLIN 250 MG
1 CAPSULE ORAL DAILY
Status: DISCONTINUED | OUTPATIENT
Start: 2022-10-02 | End: 2022-10-02 | Stop reason: HOSPADM

## 2022-10-02 RX ORDER — AMLODIPINE BESYLATE 5 MG/1
5 TABLET ORAL
Status: DISCONTINUED | OUTPATIENT
Start: 2022-10-02 | End: 2022-10-02 | Stop reason: HOSPADM

## 2022-10-02 RX ORDER — VALSARTAN 80 MG/1
80 TABLET ORAL
Qty: 30 TABLET | Refills: 0 | Status: SHIPPED | OUTPATIENT
Start: 2022-10-03

## 2022-10-02 RX ORDER — POLYETHYLENE GLYCOL 3350 17 G/17G
17 POWDER, FOR SOLUTION ORAL DAILY
Start: 2022-10-02

## 2022-10-02 RX ADMIN — AMLODIPINE BESYLATE 5 MG: 5 TABLET ORAL at 12:05

## 2022-10-02 RX ADMIN — POLYETHYLENE GLYCOL 3350 17 G: 17 POWDER, FOR SOLUTION ORAL at 12:05

## 2022-10-02 RX ADMIN — DOCUSATE SODIUM 50MG AND SENNOSIDES 8.6MG 1 TABLET: 8.6; 5 TABLET, FILM COATED ORAL at 12:05

## 2022-10-02 NOTE — SIGNIFICANT NOTE
10/02/22 1116   OTHER   Discipline physical therapist   Rehab Time/Intention   Session Not Performed other (see comments)  (at baseline, no skilled PT need. Spoke with patient/son and RN at baseline. Pt with DC orders home. Pt without physical limitiations and has been able to mobilize without assistance. no skilled PT required. PT will DC.)   Therapy Assessment/Plan (PT)   Criteria for Skilled Interventions Met (PT) no problems identified which require skilled intervention

## 2022-10-02 NOTE — CASE MANAGEMENT/SOCIAL WORK
Continued Stay Note  [facility]     Patient Name: Larry Dumont  MRN: [id number]  Today's Date: 10/2/2022    Admit Date: 10/1/2022    Plan: Return home with family   Discharge Plan     Row Name 10/02/22 1134       Plan    Plan Return home with family    Plan Comments Received call from retail pharmacy.  Take home meds are $21 and patient/son state she does not have money for that.  CCP will cover.  Niyah HAQUE                    Expected Discharge Date and Time     Expected Discharge Date Expected Discharge Time    Oct 2, 2022             Becky S. [place], RN

## 2022-10-02 NOTE — H&P
HISTORY AND PHYSICAL   [facility]        Date of Admission: 10/1/2022  Patient Identification:  Name: Larry Dumont  Age: 67 y.o.  Sex: female  :  1955  MRN: [id number]                     Primary Care Physician: Damian Bashir MD    Chief Complaint:  67 year old female who presented to the emergency room with abdominal pain which started earlier today; she also had some shortness of air; it awakened her from sleep around 2am; no vomiting or diarrhea; she recently moved here and does not have a pcp; no sick contacts; she has a history of hypertension but has not had any medication since she moved; no fever or chills    History of Present Illness:   As above    Past Medical History:  Past Medical History:   Diagnosis Date   • Hypertension      Past Surgical History:  Past Surgical History:   Procedure Laterality Date   • ABDOMINAL SURGERY        Home Meds:  No medications prior to admission.       Allergies:  Allergies   Allergen Reactions   • Shellfish-Derived Products Provider Review Needed     Immunizations:    There is no immunization history on file for this patient.  Social History:   Social History     Social History Narrative   • Not on file     Social History     Socioeconomic History   • Marital status:    Tobacco Use   • Smoking status: Never Smoker       Family History:  History reviewed. No pertinent family history.     Review of Systems  See history of present illness and past medical history.  Patient denies headache, dizziness, syncope, falls, trauma, change in vision, change in hearing, change in taste, changes in weight, changes in appetite, focal weakness, numbness, or paresthesia.  Patient denies chest pain, palpitations, dyspnea, orthopnea, PND, cough, sinus pressure, rhinorrhea, epistaxis, hemoptysis, nausea, vomiting,hematemesis, diarrhea, constipation or hematchezia.  Denies cold or heat intolerance, polydipsia, polyuria, polyphagia. Denies hematuria,  pyuria, dysuria, hesitancy, frequency or urgency. Denies consumption of raw and under cooked meats foods or change in water source.  Denies fever, chills, sweats, night sweats.  Denies missing any routine medications. Remainder of ROS is negative.    Objective:  T Max 24 hrs: Temp (24hrs), Av.3 °F (36.8 °C), Min:97.4 °F (36.3 °C), Max:98.8 °F (37.1 °C)    Vitals Ranges:   Temp:  [97.4 °F (36.3 °C)-98.8 °F (37.1 °C)] 98.7 °F (37.1 °C)  Heart Rate:  [66-89] 80  Resp:  [18] 18  BP: (112-204)/() 133/80      Exam:  /80 (BP Location: Left arm, Patient Position: Lying)   Pulse 80   Temp 98.7 °F (37.1 °C) (Oral)   Resp 18   Wt 61.2 kg (135 lb)   SpO2 95%     General Appearance:    Alert, cooperative, no distress, appears stated age   Head:    Normocephalic, without obvious abnormality, atraumatic   Eyes:    PERRL, conjunctivae/corneas clear, EOM's intact, both eyes   Ears:    Normal external ear canals, both ears   Nose:   Nares normal, septum midline, mucosa normal, no drainage    or sinus tenderness   Throat:   Lips, mucosa, and tongue normal   Neck:   Supple, symmetrical, trachea midline, no adenopathy;     thyroid:  no enlargement/tenderness/nodules; no carotid    bruit or JVD   Back:     Symmetric, no curvature, ROM normal, no CVA tenderness   Lungs:     Decreased breath souinds bilaterally, respirations unlabored   Chest Wall:    No tenderness or deformity    Heart:    Regular rate and rhythm, S1 and S2 normal, no murmur, rub   or gallop   Abdomen:     Soft, nontender, bowel sounds active all four quadrants,     no masses, no hepatomegaly, no splenomegaly   Extremities:   Extremities normal, atraumatic, no cyanosis or edema   Pulses:   2+ and symmetric all extremities   Skin:   Skin color, texture, turgor normal, no rashes or lesions   Lymph nodes:   Cervical, supraclavicular, and axillary nodes normal   Neurologic:   CNII-XII intact, normal strength, sensation intact throughout      .    Data  Review:  Labs in chart were reviewed.  WBC   Date Value Ref Range Status   10/01/2022 5.03 3.40 - 10.80 10*3/mm3 Final     Hemoglobin   Date Value Ref Range Status   10/01/2022 12.0 12.0 - 15.9 g/dL Final     Hematocrit   Date Value Ref Range Status   10/01/2022 37.3 34.0 - 46.6 % Final     Platelets   Date Value Ref Range Status   10/01/2022 272 140 - 450 10*3/mm3 Final     Sodium   Date Value Ref Range Status   10/01/2022 139 136 - 145 mmol/L Final     Potassium   Date Value Ref Range Status   10/01/2022 3.7 3.5 - 5.2 mmol/L Final     Chloride   Date Value Ref Range Status   10/01/2022 100 98 - 107 mmol/L Final     CO2   Date Value Ref Range Status   10/01/2022 28.6 22.0 - 29.0 mmol/L Final     BUN   Date Value Ref Range Status   10/01/2022 14 8 - 23 mg/dL Final     Creatinine   Date Value Ref Range Status   10/01/2022 0.86 0.57 - 1.00 mg/dL Final     Glucose   Date Value Ref Range Status   10/01/2022 90 65 - 99 mg/dL Final     Calcium   Date Value Ref Range Status   10/01/2022 8.7 8.6 - 10.5 mg/dL Final     AST (SGOT)   Date Value Ref Range Status   10/01/2022 25 1 - 32 U/L Final     ALT (SGPT)   Date Value Ref Range Status   10/01/2022 17 1 - 33 U/L Final     Alkaline Phosphatase   Date Value Ref Range Status   10/01/2022 82 39 - 117 U/L Final                Imaging Results (All)     Procedure Component Value Units Date/Time    CT Abdomen Pelvis With Contrast [[id number]] Collected: 10/01/22 1441     Updated: 10/01/22 1455    Narrative:      CT ANGIOGRAM OF THE CHEST, CT ABDOMEN AND PELVIS WITH IV CONTRAST     HISTORY: Upper abdominal pain and nausea, concern for aortic dissection     TECHNIQUE: Radiation dose reduction techniques were utilized, including  automated exposure control and exposure modulation based on body size.   CT angiogram of the chest was performed following the administration of  IV contrast. Multiple coronal, sagittal, and 3-D reconstruction images  were obtained. Following this, CT of the  abdomen and pelvis was  performed with IV contrast. Coronal and sagittal reformatted images were  obtained.     COMPARISON: None available     FINDINGS:   CHEST:  There is no evidence of aortic dissection or thoracic aortic aneurysm.  There is no appreciable lymphadenopathy. There is no pleural effusion.  There is dependent atelectasis within the lungs bilaterally. No evidence  of dense airspace consolidation. No acute bony abnormality.     ABDOMEN:  The liver is unremarkable. Previous cholecystectomy. Small cyst or  hemangioma in the spleen. The kidneys, adrenal glands, and pancreas are  unremarkable. No evidence of aortic aneurysm.     PELVIS:  There is no free fluid in the pelvis. There is a cystic lesion with  septations in the right adnexa measuring about 5.3 x 3.9 cm. Colon is  unremarkable. The appendix is normal. No acute bony abnormality.       Impression:      1. No evidence of aortic dissection or aneurysm  2. There is a cystic lesion with septations in the right adnexa measures  about 5.3 cm. Further evaluation recommended with pelvic ultrasound as  well as referral to OB/GYN  3. Previous cholecystectomy.        Radiation dose reduction techniques were utilized, including automated  exposure control and exposure modulation based on body size.     This report was finalized on 10/1/2022 2:52 PM by Dr. Ankur Gates M.D.       CT Angiogram Chest [[id number]] Collected: 10/01/22 1441     Updated: 10/01/22 1455    Narrative:      CT ANGIOGRAM OF THE CHEST, CT ABDOMEN AND PELVIS WITH IV CONTRAST     HISTORY: Upper abdominal pain and nausea, concern for aortic dissection     TECHNIQUE: Radiation dose reduction techniques were utilized, including  automated exposure control and exposure modulation based on body size.   CT angiogram of the chest was performed following the administration of  IV contrast. Multiple coronal, sagittal, and 3-D reconstruction images  were obtained. Following this, CT of the abdomen  and pelvis was  performed with IV contrast. Coronal and sagittal reformatted images were  obtained.     COMPARISON: None available     FINDINGS:   CHEST:  There is no evidence of aortic dissection or thoracic aortic aneurysm.  There is no appreciable lymphadenopathy. There is no pleural effusion.  There is dependent atelectasis within the lungs bilaterally. No evidence  of dense airspace consolidation. No acute bony abnormality.     ABDOMEN:  The liver is unremarkable. Previous cholecystectomy. Small cyst or  hemangioma in the spleen. The kidneys, adrenal glands, and pancreas are  unremarkable. No evidence of aortic aneurysm.     PELVIS:  There is no free fluid in the pelvis. There is a cystic lesion with  septations in the right adnexa measuring about 5.3 x 3.9 cm. Colon is  unremarkable. The appendix is normal. No acute bony abnormality.       Impression:      1. No evidence of aortic dissection or aneurysm  2. There is a cystic lesion with septations in the right adnexa measures  about 5.3 cm. Further evaluation recommended with pelvic ultrasound as  well as referral to OB/GYN  3. Previous cholecystectomy.        Radiation dose reduction techniques were utilized, including automated  exposure control and exposure modulation based on body size.     This report was finalized on 10/1/2022 2:52 PM by Dr. Ankur Gates M.D.       XR Chest 1 View [[id number]] Collected: 10/01/22 1149     Updated: 10/01/22 1154    Narrative:      XR CHEST 1 VW-     HISTORY:  Nausea, vomiting, pain.     COMPARISON:  None     FINDINGS:    A single portable view the chest was obtained. The cardiac silhouette is  mildly enlarged. There is calcific aortic atherosclerosis. There are  mildly prominent interstitial opacities throughout both lungs, most  pronounced at the lung bases, which are nonspecific and could reflect  edema, background scarring, and/or multifocal pneumonia in the  appropriate clinical setting. Otherwise, there is no  focal  consolidation. Pleural spaces are clear. There is multilevel  degenerative disc disease.     This report was finalized on 10/1/2022 11:50 AM by Dr. Reema Diamond M.D.               Assessment:  Active Hospital Problems    Diagnosis  POA   • Hypertensive urgency [I16.0]  Yes      Resolved Hospital Problems   No resolved problems to display.   abdominal pain  Adnexal mass    Plan:  Will ask gyn to see her  Continue bp meds  Monitor on telemetry  Abdominal pain appears to have improved  D.w patient and family as well as ED provider    Aga Benitez MD  10/1/2022  20:29 EDT

## 2022-10-02 NOTE — DISCHARGE SUMMARY
NAME: Larry Dumont ADMIT: 10/1/2022   : 1955  PCP: Damian Bashir MD    MRN: [id number] LOS: 0 days   AGE/SEX: 67 y.o. female  ROOM: 64/     Date of Admission:  10/1/2022  Date of Discharge:  10/2/2022    PCP: Damian Bashir MD    CHIEF COMPLAINT  Chest Pain, Flank Pain, and Abdominal Pain      DISCHARGE DIAGNOSIS  Active Hospital Problems    Diagnosis  POA   • **Hypertensive urgency [I16.0]  Yes   • Abnormal CT of the abdomen [R93.5]  Unknown   • Slow transit constipation [K59.01]  Unknown   • Nausea [R11.0]  Unknown      Resolved Hospital Problems   No resolved problems to display.       SECONDARY DIAGNOSES  Past Medical History:   Diagnosis Date   • Hypertension        CONSULTS       HOSPITAL COURSE        Suleman Dumont is a 67 y.o. female who presents to the ED for evaluation of abdominal pain dyspnea and nausea.    Patient and family moved here from [place] about 5 months ago, she does not have a PCP.  Has a history of significant hypertension, was previously treated but has been unmedicated since moving.  No known history of high cholesterol or diabetes.    Came to [facility] ER. BP elevated 191/100. CT scan with cystic lesion in the right adnexa. No acute process. BP better with agents. She has been having some constipation.     Her symptoms are improved and she wishes for discharge to home.  I did discuss with her the importance of following up with her primary care physician to monitor blood pressure as well as her other medical issues.  Also discussed with them the need to see an gynecologist for her abnormal area in her right adnexa.      DIAGNOSTICS  CT Abdomen Pelvis With Contrast [[id number]] Brooks as Reviewed   Order Status: Completed Collected: 10/01/22 1441    Updated: 10/01/22 1455   Narrative:     CT ANGIOGRAM OF THE CHEST, CT ABDOMEN AND PELVIS WITH IV CONTRAST       HISTORY: Upper abdominal pain and nausea, concern for aortic dissection       TECHNIQUE: Radiation  dose reduction techniques were utilized, including   automated exposure control and exposure modulation based on body size.   CT angiogram of the chest was performed following the administration of   IV contrast. Multiple coronal, sagittal, and 3-D reconstruction images   were obtained. Following this, CT of the abdomen and pelvis was   performed with IV contrast. Coronal and sagittal reformatted images were   obtained.       COMPARISON: None available       FINDINGS:   CHEST:   There is no evidence of aortic dissection or thoracic aortic aneurysm.   There is no appreciable lymphadenopathy. There is no pleural effusion.   There is dependent atelectasis within the lungs bilaterally. No evidence   of dense airspace consolidation. No acute bony abnormality.       ABDOMEN:   The liver is unremarkable. Previous cholecystectomy. Small cyst or   hemangioma in the spleen. The kidneys, adrenal glands, and pancreas are   unremarkable. No evidence of aortic aneurysm.       PELVIS:   There is no free fluid in the pelvis. There is a cystic lesion with   septations in the right adnexa measuring about 5.3 x 3.9 cm. Colon is   unremarkable. The appendix is normal. No acute bony abnormality.       Impression:     1. No evidence of aortic dissection or aneurysm   2. There is a cystic lesion with septations in the right adnexa measures   about 5.3 cm. Further evaluation recommended with pelvic ultrasound as   well as referral to OB/GYN   3. Previous cholecystectomy.           Radiation dose reduction techniques were utilized, including automated   exposure control and exposure modulation based on body size.       This report was finalized on 10/1/2022 2:52 PM by Dr. Ankur Gates M.D.        CT Angiogram Chest [[id number]] Brooks as Reviewed   Order Status: Completed Collected: 10/01/22 1441    Updated: 10/01/22 1455   Narrative:     CT ANGIOGRAM OF THE CHEST, CT ABDOMEN AND PELVIS WITH IV CONTRAST       HISTORY: Upper abdominal pain  and nausea, concern for aortic dissection       TECHNIQUE: Radiation dose reduction techniques were utilized, including   automated exposure control and exposure modulation based on body size.   CT angiogram of the chest was performed following the administration of   IV contrast. Multiple coronal, sagittal, and 3-D reconstruction images   were obtained. Following this, CT of the abdomen and pelvis was   performed with IV contrast. Coronal and sagittal reformatted images were   obtained.       COMPARISON: None available       FINDINGS:   CHEST:   There is no evidence of aortic dissection or thoracic aortic aneurysm.   There is no appreciable lymphadenopathy. There is no pleural effusion.   There is dependent atelectasis within the lungs bilaterally. No evidence   of dense airspace consolidation. No acute bony abnormality.       ABDOMEN:   The liver is unremarkable. Previous cholecystectomy. Small cyst or   hemangioma in the spleen. The kidneys, adrenal glands, and pancreas are   unremarkable. No evidence of aortic aneurysm.       PELVIS:   There is no free fluid in the pelvis. There is a cystic lesion with   septations in the right adnexa measuring about 5.3 x 3.9 cm. Colon is   unremarkable. The appendix is normal. No acute bony abnormality.       Impression:     1. No evidence of aortic dissection or aneurysm   2. There is a cystic lesion with septations in the right adnexa measures   about 5.3 cm. Further evaluation recommended with pelvic ultrasound as   well as referral to OB/GYN   3. Previous cholecystectomy.           Radiation dose reduction techniques were utilized, including automated   exposure control and exposure modulation based on body size.       This report was finalized on 10/1/2022 2:52 PM by Dr. Ankur Gates M.D.        XR Chest 1 View [[id number]] Brooks as Reviewed   Order Status: Completed Collected: 10/01/22 1149    Updated: 10/01/22 1154   Narrative:     XR CHEST 1 VW-       HISTORY:   Nausea, vomiting, pain.       COMPARISON:  None       FINDINGS:     A single portable view the chest was obtained. The cardiac silhouette is   mildly enlarged. There is calcific aortic atherosclerosis. There are   mildly prominent interstitial opacities throughout both lungs, most   pronounced at the lung bases, which are nonspecific and could reflect   edema, background scarring, and/or multifocal pneumonia in the   appropriate clinical setting. Otherwise, there is no focal   consolidation. Pleural spaces are clear. There is multilevel   degenerative disc disease.        /02/2022 0505 10/02/2022 0558 Basic Metabolic Panel [[id number]]    (Abnormal)   Blood    Final result Component Value Units   Glucose 113 High  mg/dL   BUN 16 mg/dL   Creatinine 0.84 mg/dL   Sodium 137 mmol/L   Potassium 3.9 mmol/L   Chloride 101 mmol/L   CO2 24.6 mmol/L   Calcium 8.8 mg/dL   BUN/Creatinine Ratio 19.0    Anion Gap 11.4 mmol/L   eGFR 75.3  mL/min/1.73          10/02/2022 0505 10/02/2022 0552 CBC (No Diff) [[id number]]   (Abnormal)   Blood    Final result Component Value Units   WBC 5.40 10*3/mm3   RBC 3.95 10*6/mm3   Hemoglobin 11.3 Low  g/dL   Hematocrit 33.5 Low  %   MCV 84.8 fL   MCH 28.6 pg   MCHC 33.7 g/dL   RDW 11.9 Low  %   RDW-SD 36.9 Low  fl   MPV 8.5 fL   Platelets 250 10*3/mm3          10/01/2022 1512 10/01/2022 1550 Troponin [[id number]]    Blood    Final result Component Value Units   Troponin T <0.010 ng/mL          10/01/2022 1456 10/01/2022 1507 Urinalysis With Microscopic If Indicated (No Culture) - Urine, Clean Catch [[id number]]    Urine, Clean Catch    Final result Component Value   Color, UA Yellow   Appearance, UA Clear   pH, UA 7.0   Specific Gravity, UA 1.021   Glucose, UA Negative   Ketones, UA Negative   Bilirubin, UA Negative   Blood, UA Negative   Protein, UA Negative   Leuk Esterase, UA Negative   Nitrite, UA Negative   Urobilinogen, UA 0.2 E.U./dL          10/01/2022 1315 10/01/2022 1354 Comprehensive  Metabolic Panel [[id number]]    Blood    Final result Component Value Units   Glucose 90 mg/dL   BUN 14 mg/dL   Creatinine 0.86 mg/dL   Sodium 139 mmol/L   Potassium 3.7 mmol/L   Chloride 100 mmol/L   CO2 28.6 mmol/L   Calcium 8.7 mg/dL   Total Protein 7.6 g/dL   Albumin 4.20 g/dL   ALT (SGPT) 17 U/L   AST (SGOT) 25 U/L   Alkaline Phosphatase 82 U/L   Total Bilirubin 0.3 mg/dL   Globulin 3.4 gm/dL   A/G Ratio 1.2 g/dL   BUN/Creatinine Ratio 16.3    Anion Gap 10.4 mmol/L   eGFR 73.2  mL/min/1.73          10/01/2022 1315 10/01/2022 1354 Troponin [[id number]]    Blood    Final result Component Value Units   Troponin T <0.010 ng/mL          10/01/2022 1111 10/01/2022 1219 [place] Draw [[id number]]    Blood    Final result Component Value   No component results          10/01/2022 1111 10/01/2022 1221 Lipase [[id number]]   Blood    Final result Component Value Units   Lipase 16 U/L          10/01/2022 1111 10/01/2022 1140 CBC Auto Differential [[id number]]   (Abnormal)   Blood    Final result Component Value Units   WBC 5.03 10*3/mm3   RBC 4.15 10*6/mm3   Hemoglobin 12.0 g/dL   Hematocrit 37.3 %   MCV 89.9 fL   MCH 28.9 pg   MCHC 32.2 g/dL   RDW 12.4 %   RDW-SD 41.2 fl   MPV 8.6 fL   Platelets 272 10*3/mm3   Neutrophil % 47.1 %   Lymphocyte % 43.9 %   Monocyte % 8.0 %   Eosinophil % 0.4 %   Basophil % 0.2 %   Immature Grans % 0.4 %   Neutrophils, Absolute 2.37 10*3/mm3   Lymphocytes, Absolute 2.21 10*3/mm3   Monocytes, Absolute 0.40 10*3/mm3   Eosinophils, Absolute 0.02 10*3/mm3   Basophils, Absolute 0.01 10*3/mm3   Immature Grans, Absolute 0.02 10*3/mm3   nRBC 1.0 High  /100 WBC                PHYSICAL EXAM  Objective    Alert  nad  No resp distress  Soft, nt    CONDITION ON DISCHARGE  Stable.      DISCHARGE DISPOSITION   Home or Self Care      DISCHARGE MEDICATIONS       Your medication list      START taking these medications      Instructions Last Dose Given Next Dose Due   amLODIPine 5 MG tablet  Commonly known as:  NORVASC      Take 1 tablet by mouth Daily.       polyethylene glycol 17 g packet  Commonly known as: MIRALAX      Take 17 g by mouth Daily.       valsartan 80 MG tablet  Commonly known as: DIOVAN  Start taking on: October 3, 2022      Take 1 tablet by mouth Daily.             Where to Get Your Medications      [facility] [place] [address] [facility] [address]    Hours: 7:00 AM-6:00 PM Mon-Fri, 8:00 AM-4:30 PM Sat-Sun (Closed 12-12:30PM) Phone: [phone]   · amLODIPine 5 MG tablet  · valsartan 80 MG tablet     Information about where to get these medications is not yet available    Ask your nurse or doctor about these medications  · polyethylene glycol 17 g packet        No future appointments.  Additional Instructions for the Follow-ups that You Need to Schedule     Discharge Follow-up with Specialty: PCP in 1-2 weeks, Gynecology in 2 weeks or as able to make appointment   As directed      Specialty: PCP in 1-2 weeks, Gynecology in 2 weeks or as able to make appointment            Follow-up Information     Damian Bashir MD .    Specialty: Internal Medicine  Contact information:  [address] [facility] [address] [phone]                         TEST  RESULTS PENDING AT DISCHARGE         Jeremy Gomez MD  [facility]  10/02/22  13:06 EDT      Time: greater than 32 minutes on discharge  It was a pleasure taking care of this patient while in the hospital.

## 2022-10-02 NOTE — PLAN OF CARE
Goal Outcome Evaluation:  Plan of Care Reviewed With: patient        Progress: no change  Outcome Evaluation: oxygen decreased.  nasal cannula placed.  other VS stable.  pt expressed pain.  meds given per orders.  the pt's son helped translate.  gynecologic oncology consulted.  will continue to monitor.

## 2022-10-02 NOTE — CASE MANAGEMENT/SOCIAL WORK
Case Management Discharge Note      Final Note: DC'd home              Transportation Services  Private: Car    Final Discharge Disposition Code: 01 - home or self-care

## 2022-10-14 ENCOUNTER — OFFICE VISIT (OUTPATIENT)
Dept: OBSTETRICS AND GYNECOLOGY | Facility: CLINIC | Age: 67
End: 2022-10-14

## 2022-10-14 VITALS
SYSTOLIC BLOOD PRESSURE: 158 MMHG | BODY MASS INDEX: 26.9 KG/M2 | HEIGHT: 60 IN | DIASTOLIC BLOOD PRESSURE: 94 MMHG | WEIGHT: 137 LBS

## 2022-10-14 DIAGNOSIS — R19.00 PELVIC MASS IN FEMALE: Primary | ICD-10-CM

## 2022-10-14 DIAGNOSIS — R10.12 LEFT UPPER QUADRANT ABDOMINAL PAIN: ICD-10-CM

## 2022-10-14 PROCEDURE — 99203 OFFICE O/P NEW LOW 30 MIN: CPT | Performed by: OBSTETRICS & GYNECOLOGY

## 2022-10-14 NOTE — PROGRESS NOTES
"        REASON FOR FOLLOWUP/CHIEF COMPLAINT: Follow-up CT scan in the ER     HISTORY OF PRESENT ILLNESS: Patient was seen in the emergency room recently.  She has chronic constipation and was seen for left upper quadrant pain and a CT scan revealed a cystic mass in the right adnexa.  They were doing the study because there was concern for an aortic aneurysm.  It was a 5.3 x 3.9 cm septated cystic mass in the right adnexa.  The colon was normal appendix was normal there was no free fluid or adenopathy or omental thickening.  She had no vaginal bleeding.  She is seen today with her son who provides translation.      Past Medical History, Past Surgical History, Social History, Family History have been reviewed and are without significant changes except as mentioned.    Review of Systems   Review of Systems   Constitutional: Negative for chills and fever.   Gastrointestinal: Positive for abdominal pain and constipation.   Genitourinary: Negative for vaginal bleeding.   Patient reports that she is not currently experiencing any symptoms of urinary incontinence.      Medications:  The current medication list was reviewed in the EMR    ALLERGIES:    Allergies   Allergen Reactions   • Shell[employer] Provider Review Needed              Vitals:    10/14/22 1034   BP: 158/94   Weight: 62.1 kg (137 lb)   Height: 152.4 cm (60\")     Physical Exam    CONSTITUTIONAL:  Vital signs reviewed.  No distress, looks comfortable.    GASTROINTESTINAL: Abdomen appears unremarkable.  Nontender.  No hepatomegaly.  No splenomegaly.    PSYCHIATRIC:  Normal judgment and insight.  Normal mood and affect.       RECENT LABS:  WBC   Date Value Ref Range Status   10/02/2022 5.40 3.40 - 10.80 10*3/mm3 Final   10/01/2022 5.03 3.40 - 10.80 10*3/mm3 Final     Hemoglobin   Date Value Ref Range Status   10/02/2022 11.3 (L) 12.0 - 15.9 g/dL Final   10/01/2022 12.0 12.0 - 15.9 g/dL Final     Platelets   Date Value Ref Range Status   10/02/2022 250 " 140 - 450 10*3/mm3 Final   10/01/2022 272 140 - 450 10*3/mm3 Final       Pelvic ultrasound done today in the office shows a 6.9 x 4 cm multicystic structure in the right adnexa with multiple septations.  There is no free fluid in the pelvis.  There are no solid components to the mass.        ASSESSMENT/PLAN:  [facility] Room/bed info not found     1.  Multiseptated simple appearing cystic mass in the right adnexa.  We will get  and recommend follow-up ultrasound in 3 months.  I had an extensive discussion with the patient through her son interpreting.  Her main complaint and main concern is the left upper quadrant discomfort and the chronic constipation.  He was prescribed medication in the emergency room but has not been able to get this filled due to insurance coverage.  She was also scheduled to have a colonoscopy and that also was canceled due to some insurance miscommunication.  They are requesting a different primary care provider and I put a referral in today.  I think the left upper quadrant issue and the colonoscopy should be done first.  I told her that I did not think this cystic structure represented any anything malignant but definitely needs to be followed.  I would rather her get the left upper quadrant discomfort issue and colonoscopy done prior to taking her to the operating room for a laparoscopic oophorectomy.  She will follow-up here in 3 months with another ultrasound assuming the  comes back normal.

## 2022-10-15 LAB — CANCER AG125 SERPL-ACNC: 5.7 U/ML (ref 0–38.1)

## 2022-10-18 ENCOUNTER — TELEPHONE (OUTPATIENT)
Dept: OBSTETRICS AND GYNECOLOGY | Facility: CLINIC | Age: 67
End: 2022-10-18

## 2022-10-18 NOTE — TELEPHONE ENCOUNTER
----- Message from Gopal Gonzalez MD sent at 10/16/2022  7:07 AM EDT -----  Call to notify patient and/or her son that the  level that was drawn was in normal range.

## 2023-01-17 ENCOUNTER — TELEPHONE (OUTPATIENT)
Dept: OBSTETRICS AND GYNECOLOGY | Facility: CLINIC | Age: 68
End: 2023-01-17
Payer: MEDICAID